# Patient Record
Sex: FEMALE | Race: WHITE | URBAN - METROPOLITAN AREA
[De-identification: names, ages, dates, MRNs, and addresses within clinical notes are randomized per-mention and may not be internally consistent; named-entity substitution may affect disease eponyms.]

---
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
## 2018-01-09 NOTE — PROGRESS NOTES
Discussion/Summary    RN saw student for [employer]  Goals were set  Follow up 1-2 months to complete HS and needs school PE at that time  -Lizbet Martinez PA-C  Healthy Starts Teaching and Goals   Session 1: Overview (Overview of nrgBantony 59003! Review possible medical complications R/T elevated BMI and obesity  Eat breakfast most days  Review completed food diary or typical food intake and patterns  Student will try to vary the foods she is eating   Date Goal Set: 3/2/17   Goal session 2: Student will limit the amount of juice intake   Date Goal Set: 3/2/17          Chief Complaint  Student is here for F/U visit to [facility]  She is connected to [employer]  She needs to be connected to Dental (consent signed for Marisela Felix) PCP She is here today for Healthy Steps  She will return in 1-2 mo for PE and to finish Healthy Steps PP/RN      History of Present Illness    Healthy Steps to Eating And Exercise   How many meals do you eat in the school cafeteria each day? 2  How many meals do you eat at home each day? 1  How many meals do you eat at the dining table with other family members each day? varies depending on parents work schedule  How many days of the week does your family eat dinner together? 2   How many days do you eat breakfast each week? 7  How many meals are eaten out or brought in each week? 1  How many snacks do you eat each day including any food eaten between meals? 2  How many sweetened drinks, (soda, juice, lemonade, iced tea or sports drinks) do you have each day? 1  How many hours each day do you watch TV, movies, use computer, tablet, phone or video games? 1  How many minutes of physical activity do you do each day? 2  Describe activity: dancing  Assessment of Readiness to Change   The patient is not concerned about Her own food choices or eating pattern  The patient is not concerned about Her own activity level      The patient is not concerned about Her own weight  The patient is willing to become more active  The patient is willing to develop a healthier eating style  Active Problems    1  Overweight (278 02) (E66 3)    Surgical History    1  Denied: History of Previous Surgery - During Childhood    Family History  Mother    1  No pertinent family history    Social History    · Born in [facility]   ·    · Never a smoker   · No secondhand smoke exposure (V49 89) (Z78 9)   · Primary language is Polish    Allergies    1  No Known Drug Allergies    2   No Known Food Allergies    Vitals  Signs   Recorded: [id number] 10:15AM   Weight: 217 lb 5 oz  2-20 Weight Percentile: 99 %    Future Appointments    Date/Time Provider Specialty Site   04/06/2017 09:40 AM [facility]     Signatures   Electronically signed by : Eunice Nugent, [facility]; Mar  2 2017 10:20AM EST                       (Author)    Electronically signed by : RAYMUNDO Faye ; Mar  3 2017  2:05PM EST

## 2018-01-10 NOTE — MISCELLANEOUS
Message  Message Free Text Note Form: provided pcp list to get connected        Signatures   Electronically signed by : Junaid Jeffrey, ; Jun 1 2017 11:20AM EST                       (Author)

## 2018-01-11 NOTE — PROGRESS NOTES
Discussion/Summary    Student saw RN to start Healthy Steps today  Follow up 2 months for HS and follow up on connections  -Ilya SERGE Michele  Chief Complaint  Student is here for F/U visit to [facility]  She is currently in 9th grade at [employer]  She is now connected to [employer] and PCP  She still needs to be connected to Dental and Vision (she is 16 so we are having her sign a dental consent today for the Tony Ocampo and was given a vision voucher but has not redeemed  Will give her a list of vision providers today  She is here today for PHQ9, start Healthy Steps and discuss goals for future (wants to go to college) She has spoken with Guidance Dept  regarding college and has made several visits to local colleges  Return in 1-2 months or HS and check college and denta/vision status PP/Rn      History of Present Illness    Healthy Steps to Eating And Exercise   How many meals do you eat in the school cafeteria each day? 2  How many meals do you eat at home each day? 1  How many meals do you eat at the dining table with other family members each day? varies depending on parents work schedule  How many days of the week does your family eat dinner together? 2   How many days do you eat breakfast each week? 7  How many meals are eaten out or brought in each week? 1  How many snacks do you eat each day including any food eaten between meals? 2  How many sweetened drinks, (soda, juice, lemonade, iced tea or sports drinks) do you have each day? 1  How many hours each day do you watch TV, movies, use computer, tablet, phone or video games? 1  How many minutes of physical activity do you do each day? 2  Describe activity: dancing  Assessment of Readiness to Change   The patient is not concerned about Her own food choices or eating pattern  The patient is not concerned about Her own activity level  The patient is not concerned about Her own weight      The patient is willing to become more active  The patient is willing to develop a healthier eating style  Active Problems    1  Overweight (278 02) (E66 3)    Surgical History    1  Denied: History of Previous Surgery - During Childhood    Family History  Mother    1  No pertinent family history    Social History    · Born in [facility]   ·    · Never a smoker   · No secondhand smoke exposure (V49 89) (Z78 9)   · Primary language is Belarusian    Allergies    1  No Known Drug Allergies    2  No Known Food Allergies    Results/Data  PHQ-A Adolescent Depression Screening [id number] 12:00PM User, s     Test Name Result Flag Reference   PHQ-9 Adolescent Depression Score 0     Q1: 0, Q2: 0, Q3: 0, Q4: 0, Q5: 0, Q6: 0, Q7: 0, Q8: 0, Q9: 0   PHQ-9 Adolescent Depression Screening Negative     PHQ-9 Difficulty Level Not difficult at all     In the past year have you felt depressed or sad most days, even if you felt okay sometimes? No     Has there been a time in the past month when you have had serious thoughts about ending your life? No     Have you EVER in your WHOLE LIFE, tried to kill yourself or made a suicide attempt? No     PHQ-9 Severity No Depression         Procedure    Procedure: Visual Acuity Test    Indication: routine screening  Inforrmation supplied by Jessica Harding RN  Results: 20/25 in the right eye without corrective device, 20/20 in the left eye without corrective device   Color vision was reported by Jessica Harding RN and the results were normal    The patient was cooperative, but tolerated the procedure well  Follow-up  Vision screening normal but student wore glasses in DR and feels that her vision "gets blurry" at times  Will refer to eye dr for thorough exam PP/RN  The patient was referred to Opthomology        Future Appointments    Date/Time Provider Specialty Site   03/02/2017 10:00 AM Mobile [facility]     Signatures   Electronically signed by : BRITTNI Acosta; Jan 5 2017 12:26PM EST                       (Author)    Electronically signed by : RAYMUNDO Galvin ; Jan 5 2017  2:20PM EST

## 2018-01-11 NOTE — PROGRESS NOTES
Discussion/Summary    Student was here for final HS teaching session  Goals set  Return in 1 month for wrap up HS weight and will do school PE then  Healthy Starts Teaching and Goals   Session 1: Overview (Overview of ashvingBalance 67311! Review possible medical complications R/T elevated BMI and obesity  Eat breakfast most days  Review completed food diary or typical food intake and patterns  Activity diary or fitness apps  Review Step Handout  Review Categories of Exercise Handout  Limit screen time  Where do you eat? What about water? Student will try to vary the foods she is eating   Date Goal Set: 3/2/17 Date Goal Achieved: 4/6/17   Goal session 2: Student will limit the amount of juice intake   Date Goal Set: 3/2/17 Date Goal Achieved: 4/6/17  Student will exercise 60 minutes a day 5x a week   Date Goal Set: 4/6/17  Student will increase amount of water intake 6-8 glasses daily   Date Goal Set: 4/6/17      Chief Complaint  Student is here for F/U visit to [facility]  She is in 12th grade at [employer]  She is connected to Insurance and Dental (seen on the dental [place]) She needs to be connected to PCP  She is here today for Healthy Steps PP/RN      History of Present Illness    Healthy Steps to Eating And Exercise   How many meals do you eat in the school cafeteria each day? 2  How many meals do you eat at home each day? 1  How many meals do you eat at the dining table with other family members each day? varies depending on parents work schedule  How many days of the week does your family eat dinner together? 2   How many days do you eat breakfast each week? 7  How many meals are eaten out or brought in each week? 1  How many snacks do you eat each day including any food eaten between meals? 2  How many sweetened drinks, (soda, juice, lemonade, iced tea or sports drinks) do you have each day? 1      How many hours each day do you watch TV, movies, use computer, tablet, phone or video games? 1  How many minutes of physical activity do you do each day? 2  Describe activity: dancing  Assessment of Readiness to Change   The patient is not concerned about Her own food choices or eating pattern  The patient is not concerned about Her own activity level  The patient is not concerned about Her own weight  The patient is willing to become more active  The patient is willing to develop a healthier eating style  Active Problems    1  Overweight (278 02) (E66 3)    Surgical History    1  Denied: History of Previous Surgery - During Childhood    Family History  Mother    1  No pertinent family history    Social History    · Born in [facility]   ·    · Never a smoker   · No secondhand smoke exposure (V49 89) (Z78 9)   · Primary language is Turks [place] Islander    Allergies    1  No Known Drug Allergies    2  No Known Food Allergies    Vitals  Signs   Recorded: 81TLG3072 12:41PM   Weight: 220 lb 5 oz  2-20 Weight Percentile: 99 %    Future Appointments    Date/Time Provider Specialty Site   06[facility]01/2017 10:00 [facility]     Signatures   Electronically signed by : Xiomara Hernandez [facility];  Apr 6 2017  1:05PM EST                       (Author)    Electronically signed by : RAYMUNDO Fitzpatrick ; Apr 6 2017  1:27PM EST

## 2018-01-11 NOTE — MISCELLANEOUS
Message  Message Free Text Note Form: student attended dental [place]  confirmed all her connections        Signatures   Electronically signed by : Chris Asif, ; Jun 1 2017 10:36AM EST                       (Author)

## 2018-01-12 NOTE — MISCELLANEOUS
Message  Message Free Text Note Form: Student just obtain medical insurance  assign to [facility]  student calling for eye doctor   provided the student with dental consent        Signatures   Electronically signed by : Coretta Rust, ; Jan 5 2017 11:42AM EST                       (Author)    Electronically signed by : Coretta Rust, ; Jan 5 2017 11:48AM EST                       (Author)

## 2018-01-13 VITALS — WEIGHT: 217.31 LBS

## 2018-01-13 NOTE — MISCELLANEOUS
Message  Message Free Text Note Form: LM for mom to confirmed medical insurance provided dental consent and vision voucher  From DR [place]   Electronically signed by : Miguelito Hilton, ; Oct  6 2016 11:42AM EST                       (Author)

## 2018-01-14 VITALS — HEIGHT: 66 IN | WEIGHT: 221.31 LBS | BODY MASS INDEX: 35.57 KG/M2

## 2018-01-14 VITALS — BODY MASS INDEX: 35.56 KG/M2 | WEIGHT: 220.31 LBS

## 2018-01-15 NOTE — PROGRESS NOTES
Assessment    1  Encounter for preventive health examination (V70 0) (Z00 00)   2  Overweight (278 02) (E66 3)    Plan  Health Maintenance    · Follow-up visit in 3 months Evaluation and Treatment  Follow-up  Status: Hold For -  Scheduling  Requested for: 58HRG8553   · Always use a seat belt and shoulder strap when riding or driving a motor vehicle ;  Status:Complete;   Done: 19TGS0607 12:12PM   · Avoid exposure to cigarette smoke ; Status:Complete;   Done: 33JRK7602 12:12PM   · Be sure your child gets at least 8 hours of sleep every night ; Status:Complete;   Done:  53KMY7774 12:12PM   · Brush your teeth 3 times a day and floss at least once a day ; Status:Complete;   Done:  65JAZ2607 12:12PM   · Have your child begin routine exercise and active play ; Status:Complete;   Done:  13LMX2110 12:12PM   · Protect your child with these gun safety rules ; Status:Complete;   Done: 69BOQ6805  12:12PM   · There are many ways to reduce your risk of catching or spreading a sexually transmitted  disease ; Status:Complete;   Done: 51PDY5764 12:12PM   · There are ways to decrease your stress and improve your sense of well-being  We  encourage you to keep active and exercise regularly  Make time to take care of yourself  and participate in activities that you enjoy  Stay connected to friends and family that can  support and comfort you  If at any time you have thoughts of harming yourself or  someone else, contact us immediately ; Status:Active; Requested for:20Oct2016;    · To prevent head injury, wear a helmet for any activity where you could be struck on the  head or fall on your head ; Status:Complete;   Done: 59Jbc4932 12:12PM   · Using a latex condom can help prevent pregnancy   It can also help to prevent the spread  of sexually transmitted infections ; Status:Complete;   Done: 16JUZ1067 12:12PM   · We recommend routine visits to a dentist ; Status:Complete;   Done: 57MUW2503 12:12PM   · Your child needs to eat a well-balanced diet ; Status:Complete;   Done: 91SCL0716  12:12PM    Discussion/Summary    AHA reviewed with student today  She is making good decisions and has good future plans  She does need to talk to her guidance counselor about her future plans and hope to apply to college  Will follow up in 3 months  Plan to offer Healthy steps program then  Her BMI is 35 and I think she'd be receptive to the program       Chief Complaint  Student is here for F/U visit to [facility]  She is currently in 12th grade at [employer]  SHe needs to be connected to [employer] [facility] [employer], PCP, Dental and Vision   SHe is here today for AHA with the Provider  PP/RN      History of Present Illness  23year old female presents for provider intake and AHA  In 12th grade- doing well in school  Moved from  last April 2016  No significant PMH  Social History: She lives with her father, stepmother, 4 sisters and and sisters are 12, 15, 23 and 13  Her parents are Biological mother lives in   She is in contact and a part of her life  dad works outside the home  mother works as works is a factory  father works as   General Health: The last health maintenance visit was May 2016 years ago  The child's health since the last visit is described as good   no illness since last visit  Dental hygiene: Good The patient brushes 3 times daily, has not had regular dental visits and had the last dental visit >2 years  Immunization status: Up to date  Caregiver concerns:   Menstrual status: The patient is menarcheal    Menses: Menstrual history:  age at menarche was 13  LMP: the last menstrual period was 10/18/16  The cycles have been regular  The duration of her recent periods has been regular and usually last 5 days  Nutrition/Elimination:   Diet:  her current diet is diverse and healthy  Sleep:   Sleep patterns: She sleeps for 8 hours at night, from 9:30-10 pm and until 5:30 am    Behavior:  The child's temperament is described as happy    Health Risks:   Childcare/School: She is in grade 12 in [employer]  School performance has been good  Sports Participation Questions:   Adolescent Health Assessment   Nutrition and Exercise   1  She eats breakfast 6-7 times during the week  Corn Flakes, fruit, water  2  She drinks 1-3 glasses of water daily  3  She drinks 1-3 sweetened beverages daily  3 glasses of juice  No regular soda  4  She eats 3-4 servings of fruits and vegetables daily  5  She participates in less than one hour of physical activity daily  Encouraged more exercise  6  She has more than two hours of screen time daily  Mental Health   7  No  Did not experience high levels of stress AT SCHOOL in the past 30 days  8  No  Did not experience high levels of stress AT HOME in the past 30 days  9  Yes  If she wanted to talk to someone about a serious problem, she would be able to turn to her mother, father, guardian, or some other adult  Either mom or dad  10  No  In the past 12 months, she has not been bullied on school property  11  No  She is not being bullied electronically  12  Yes  She is using social media  [place], [place]  13  No  In the past 12 months, she has not seriously considered suicide  14  No  In the past 12 months she has not made a suicide attempt  15  No  The patient has not ever intentionally hurt themselves  16  No  She has never been physically, sexually, or emotionally abused  Unintentional Injury   17  Yes  When she rides in a car, truck or [employer], she always wears a seat belt  18  N/A  She has not ridden a bike, motorcycle, minibike or ATV in the past 30 days  19  No  During the past 30 days, she did not ride in a car or other vehicle driven by someone who had been drinking alcohol  20  No  She has not used alcohol and then driven a car/truck/van/motorcycle at any time during the past 30 days  Violence   21   No  She has not carried a weapon - such as a gun, knife or club - on at least one day within the past 30 days  - not on school property  22  No  She or someone she lives with does not have a gun, rifle or other firearm  23  No  She has not been in a physical fight one or more times within the past 12 months  24  No  She has never been in trouble with the police  25  Yes  She feels safe at school  26  No  She has not been hit, slapped, or physically hurt on purpose by a boyfriend/girlfriend in the past 12 months  Substance Abuse   27  No  In the past 30 days, she has not smoked cigarettes of any kind  28  No  She has not smoked at least one cigarette every day within the past 30 days  29  No  During the past 30 days, she has not used chewing tobacco    30  No  She has not used any tobacco product (including snuff, cigars, cigarettes, electronic cigarettes, chew, SNUS, Hookah, Vapor) in her lifetime  31  No  In the past 30 days, she has not had at least one alcoholic drink  33  No  During the past 30 days, she did not binge drink  27  No  The patient has not used prescription medication (pills such as Xanax or Ritalin) that was not prescribed for them  34  No  She has not used alcohol or any illegal substance in the past 30 days  35  No  She has not used marijuana in the past 30 days  36  No  The patient has not used any form of cocaine in their lifetime  37  No  During the past 12 months, no one has offered, sold, or given her illegal drug(s) on school property  Reproductive Health   45  No  She has not had sex  39  N/A  She has not been tested for STDs  40  She does not know if she has had the HPV vaccine  41  No  She has not been pregnant  42  No  She has never felt pressured to have sex when she did not want to    37  No  She does not think she may be still, lesbian, bisexual, transgender or question her sexuality  Extracurricular Activities: None here  Likes to dance  Future Plans and Goals: Wants to be a doctor  She hasn't talked to guidance counselor yet but she will  School: [employer]   Strengths were reviewed  Past Medical History    The active problems and past medical history were reviewed and updated today  Surgical History    1  Denied: History of Previous Surgery - During Childhood    The surgical history was reviewed and updated today  Family History  Mother    1  No pertinent family history    The family history was reviewed and updated today  Social History    · Born in [facility]   ·    · Never a smoker   · No secondhand smoke exposure (V49 89) (Z78 9)   · Primary language is Irish    Current Meds    The medication list was reviewed and updated today  Allergies    1  No Known Drug Allergies    2  No Known Food Allergies    Physical Exam    Constitutional - General appearance: Abnormal  Overweight     Psychiatric - Orientation to person, place, and time: Normal  Mood and affect: Normal       Signatures   Electronically signed by : BRITTNI Duran; Oct 20 2016 12:16PM EST                       (Author)    Electronically signed by : RAYMUNDO Rosado ; Oct 20 2016  1:01PM EST

## 2018-01-15 NOTE — PROGRESS NOTES
Discussion/Summary    Student RN for final Healthy Steps  She will continue to work on all topics  She is connected  Urged her to pick a PCP  Follow up fabian BAÑUELOS PA-C  Healthy Starts Teaching and Goals   Session 1: Overview (Overview of nrgBalance 26644! Review possible medical complications R/T elevated BMI and obesity  Eat breakfast most days  Review completed food diary or typical food intake and patterns  Activity diary or fitness apps  Review Step Handout  Review Categories of Exercise Handout  Limit screen time  Where do you eat? What about water? Student will try to vary the foods she is eating   Date Goal Set: 3/2/17 Date Goal Achieved: 4/6/17   Goal session 2: Student will limit the amount of juice intake   Date Goal Set: 3/2/17 Date Goal Achieved: 4/6/17  Student will exercise 60 minutes a day 5x a week   Date Goal Set: 4/6/17 Date Partially Achieved: 6/1/17  Student will increase amount of water intake 6-8 glasses daily   Date Goal Set: 4/6/17 Date Goal Achieved: 6/1/17      Chief Complaint  Student is here for F/U visit to [facility]  She is currently in 12th grade at [employer]  She is connected to Insurance and Dental (seen on the [address]) She needs to be connected to PCP  She is here today to complete Healthy Steps  History of Present Illness    Healthy Steps to Eating And Exercise   How many meals do you eat in the school cafeteria each day? 2  How many meals do you eat at home each day? 1  How many meals do you eat at the dining table with other family members each day? varies depending on parents work schedule  How many days of the week does your family eat dinner together? 2   How many days do you eat breakfast each week? 7  How many meals are eaten out or brought in each week? 1  How many snacks do you eat each day including any food eaten between meals? 2      How many sweetened drinks, (soda, juice, lemonade, iced tea or sports drinks) do you have each day? 1  How many hours each day do you watch TV, movies, use computer, tablet, phone or video games? 1  How many minutes of physical activity do you do each day? 2  Describe activity: dancing  Assessment of Readiness to Change   The patient is not concerned about Her own food choices or eating pattern  The patient is not concerned about Her own activity level  The patient is not concerned about Her own weight  The patient is willing to become more active  The patient is willing to develop a healthier eating style  Active Problems    1  Overweight (278 02) (E66 3)    Surgical History    1  Denied: History of Previous Surgery - During Childhood    Family History  Mother    1  No pertinent family history    Social History    · Born in [facility]   ·    · Never a smoker   · No secondhand smoke exposure (V49 89) (Z78 9)   · Primary language is Polish    Allergies    1  No Known Drug Allergies    2   No Known Food Allergies    Vitals  Signs   Recorded: 05DBP7289 10:55AM   Height: 5 ft 6 in  Weight: 221 lb 5 oz  BMI Calculated: 35 72  BSA Calculated: 2 09  BMI Percentile: 98 %  2-20 Stature Percentile: 75 %  2-20 Weight Percentile: 99 %    Signatures   Electronically signed by : BRITTNI Azul; Jun 1 2017 11:06AM EST                       (Author)    Electronically signed by : RAYMUNDO Alvarez ; Jun 1 2017 12:59PM EST

## 2018-01-17 NOTE — PROGRESS NOTES
Assessment    1  Born in [facility]   2  Primary language is Irish   3     4  Never a smoker   5  No secondhand smoke exposure (V49 89) (Z78 9)   6  No pertinent family history : Mother    Discussion/Summary    Gabriela Shen saw student for initial intake today  I will see her back for provider intake and AHA in a few weeks  Will offer [facility] Steps program to student at that time  -Izabel Del Rio PA-C  Chief Complaint  Student is here for Initial Visit to [facility]  She is currently in 12th grade at [employer]  She moved here from [facility] 6 months ago  She needs to be connected to [employer], PCP, Dental and Vision  She is here today for initial intake and vitals  She will return in 1 month for AHA and PHQ9  PP/RN      Surgical History    1  Denied: History of Previous Surgery - During Childhood    Family History  Mother    1  No pertinent family history    Social History    · Born in [facility]   ·    · Never a smoker   · No secondhand smoke exposure (V49 89) (Z78 9)   · Primary language is Irish    Allergies    1  No Known Drug Allergies    2  No Known Food Allergies    Vitals  Signs   Recorded: [id number] 53:50JE   Systolic: 254, LLE, Sitting  Diastolic: 72, LLE, Sitting  Height: 5 ft 6 in  Weight: 217 lb 6 oz  BMI Calculated: 35 09  BSA Calculated: 2 07  BMI Percentile: 98 %  2-20 Stature Percentile: 76 %  2-20 Weight Percentile: 99 %    Procedure    Procedure: Visual Acuity Test    Indication: routine screening  Inforrmation supplied by Francella Lesch RN  Results: 20/25 in the right eye without corrective device, 20/20 in the left eye without corrective device   Color vision was reported by Francella Lesch RN and the results were normal    The patient was cooperative, but tolerated the procedure well  Follow-up  Vision screening normal but student wore glasses in [place] and feels that her vision "gets blurry" at times  Will refer to eye dr for thorough exam PP/RN   The patient was referred to Opthomology        Signatures   Electronically signed by : Anthony Bae, [facility]; Oct  6 2016 11:39AM EST                       (Author)    Electronically signed by : RAYMUNDO Mulligan ; Oct  6 2016  3:38PM EST